# Patient Record
Sex: MALE | Race: ASIAN | NOT HISPANIC OR LATINO | URBAN - METROPOLITAN AREA
[De-identification: names, ages, dates, MRNs, and addresses within clinical notes are randomized per-mention and may not be internally consistent; named-entity substitution may affect disease eponyms.]

---

## 2018-06-28 ENCOUNTER — INPATIENT (INPATIENT)
Facility: HOSPITAL | Age: 58
LOS: 5 days | Discharge: HOME | DRG: 866 | End: 2018-07-04
Attending: INTERNAL MEDICINE | Admitting: INTERNAL MEDICINE
Payer: MEDICAID

## 2018-06-28 VITALS
DIASTOLIC BLOOD PRESSURE: 80 MMHG | RESPIRATION RATE: 17 BRPM | OXYGEN SATURATION: 99 % | WEIGHT: 164.91 LBS | HEART RATE: 64 BPM | SYSTOLIC BLOOD PRESSURE: 129 MMHG | TEMPERATURE: 98 F

## 2018-06-28 DIAGNOSIS — B02.7 DISSEMINATED ZOSTER: ICD-10-CM

## 2018-06-28 LAB
ALBUMIN SERPL ELPH-MCNC: 4.2 G/DL — SIGNIFICANT CHANGE UP (ref 3.3–5)
ALP SERPL-CCNC: 51 U/L — SIGNIFICANT CHANGE UP (ref 40–120)
ALT FLD-CCNC: 29 U/L — SIGNIFICANT CHANGE UP (ref 10–45)
ANION GAP SERPL CALC-SCNC: 13 MMOL/L — SIGNIFICANT CHANGE UP (ref 5–17)
AST SERPL-CCNC: 21 U/L — SIGNIFICANT CHANGE UP (ref 10–40)
BASOPHILS # BLD AUTO: 0 K/UL — SIGNIFICANT CHANGE UP (ref 0–0.2)
BASOPHILS NFR BLD AUTO: 0.1 % — SIGNIFICANT CHANGE UP (ref 0–2)
BILIRUB SERPL-MCNC: 0.6 MG/DL — SIGNIFICANT CHANGE UP (ref 0.2–1.2)
BUN SERPL-MCNC: 18 MG/DL — SIGNIFICANT CHANGE UP (ref 7–23)
CALCIUM SERPL-MCNC: 8.7 MG/DL — SIGNIFICANT CHANGE UP (ref 8.4–10.5)
CHLORIDE SERPL-SCNC: 93 MMOL/L — LOW (ref 96–108)
CO2 SERPL-SCNC: 25 MMOL/L — SIGNIFICANT CHANGE UP (ref 22–31)
CREAT SERPL-MCNC: 0.92 MG/DL — SIGNIFICANT CHANGE UP (ref 0.5–1.3)
EOSINOPHIL # BLD AUTO: 0 K/UL — SIGNIFICANT CHANGE UP (ref 0–0.5)
EOSINOPHIL NFR BLD AUTO: 0.3 % — SIGNIFICANT CHANGE UP (ref 0–6)
GLUCOSE SERPL-MCNC: 147 MG/DL — HIGH (ref 70–99)
HCT VFR BLD CALC: 39.2 % — SIGNIFICANT CHANGE UP (ref 39–50)
HGB BLD-MCNC: 13.7 G/DL — SIGNIFICANT CHANGE UP (ref 13–17)
LYMPHOCYTES # BLD AUTO: 0.8 K/UL — LOW (ref 1–3.3)
LYMPHOCYTES # BLD AUTO: 6.6 % — LOW (ref 13–44)
MCHC RBC-ENTMCNC: 32.4 PG — SIGNIFICANT CHANGE UP (ref 27–34)
MCHC RBC-ENTMCNC: 34.9 GM/DL — SIGNIFICANT CHANGE UP (ref 32–36)
MCV RBC AUTO: 92.9 FL — SIGNIFICANT CHANGE UP (ref 80–100)
MONOCYTES # BLD AUTO: 0.8 K/UL — SIGNIFICANT CHANGE UP (ref 0–0.9)
MONOCYTES NFR BLD AUTO: 6.7 % — SIGNIFICANT CHANGE UP (ref 2–14)
NEUTROPHILS # BLD AUTO: 10.8 K/UL — HIGH (ref 1.8–7.4)
NEUTROPHILS NFR BLD AUTO: 86.3 % — HIGH (ref 43–77)
PLATELET # BLD AUTO: 213 K/UL — SIGNIFICANT CHANGE UP (ref 150–400)
POTASSIUM SERPL-MCNC: 3.6 MMOL/L — SIGNIFICANT CHANGE UP (ref 3.5–5.3)
POTASSIUM SERPL-SCNC: 3.6 MMOL/L — SIGNIFICANT CHANGE UP (ref 3.5–5.3)
PROT SERPL-MCNC: 7.3 G/DL — SIGNIFICANT CHANGE UP (ref 6–8.3)
RBC # BLD: 4.22 M/UL — SIGNIFICANT CHANGE UP (ref 4.2–5.8)
RBC # FLD: 12.1 % — SIGNIFICANT CHANGE UP (ref 10.3–14.5)
SODIUM SERPL-SCNC: 131 MMOL/L — LOW (ref 135–145)
WBC # BLD: 12.5 K/UL — HIGH (ref 3.8–10.5)
WBC # FLD AUTO: 12.5 K/UL — HIGH (ref 3.8–10.5)

## 2018-06-28 PROCEDURE — 70450 CT HEAD/BRAIN W/O DYE: CPT | Mod: 26

## 2018-06-28 PROCEDURE — 99285 EMERGENCY DEPT VISIT HI MDM: CPT

## 2018-06-28 RX ORDER — ACETAMINOPHEN 500 MG
975 TABLET ORAL ONCE
Qty: 0 | Refills: 0 | Status: COMPLETED | OUTPATIENT
Start: 2018-06-28 | End: 2018-06-28

## 2018-06-28 RX ORDER — ACETAMINOPHEN 500 MG
650 TABLET ORAL EVERY 6 HOURS
Qty: 0 | Refills: 0 | Status: DISCONTINUED | OUTPATIENT
Start: 2018-06-28 | End: 2018-07-04

## 2018-06-28 RX ORDER — KETOROLAC TROMETHAMINE 30 MG/ML
15 SYRINGE (ML) INJECTION ONCE
Qty: 0 | Refills: 0 | Status: DISCONTINUED | OUTPATIENT
Start: 2018-06-28 | End: 2018-06-28

## 2018-06-28 RX ORDER — SODIUM CHLORIDE 9 MG/ML
1000 INJECTION INTRAMUSCULAR; INTRAVENOUS; SUBCUTANEOUS
Qty: 0 | Refills: 0 | Status: DISCONTINUED | OUTPATIENT
Start: 2018-06-28 | End: 2018-07-04

## 2018-06-28 RX ORDER — GABAPENTIN 400 MG/1
100 CAPSULE ORAL THREE TIMES A DAY
Qty: 0 | Refills: 0 | Status: DISCONTINUED | OUTPATIENT
Start: 2018-06-28 | End: 2018-07-01

## 2018-06-28 RX ORDER — SODIUM CHLORIDE 9 MG/ML
2000 INJECTION INTRAMUSCULAR; INTRAVENOUS; SUBCUTANEOUS ONCE
Qty: 0 | Refills: 0 | Status: COMPLETED | OUTPATIENT
Start: 2018-06-28 | End: 2018-06-28

## 2018-06-28 RX ORDER — HEPARIN SODIUM 5000 [USP'U]/ML
5000 INJECTION INTRAVENOUS; SUBCUTANEOUS EVERY 12 HOURS
Qty: 0 | Refills: 0 | Status: DISCONTINUED | OUTPATIENT
Start: 2018-06-28 | End: 2018-07-04

## 2018-06-28 RX ORDER — ACYCLOVIR SODIUM 500 MG
800 VIAL (EA) INTRAVENOUS
Qty: 0 | Refills: 0 | Status: DISCONTINUED | OUTPATIENT
Start: 2018-06-29 | End: 2018-06-29

## 2018-06-28 RX ORDER — ACYCLOVIR SODIUM 500 MG
800 VIAL (EA) INTRAVENOUS
Qty: 0 | Refills: 0 | Status: DISCONTINUED | OUTPATIENT
Start: 2018-06-28 | End: 2018-06-28

## 2018-06-28 RX ORDER — ACYCLOVIR SODIUM 500 MG
370 VIAL (EA) INTRAVENOUS EVERY 8 HOURS
Qty: 0 | Refills: 0 | Status: DISCONTINUED | OUTPATIENT
Start: 2018-06-28 | End: 2018-06-28

## 2018-06-28 RX ORDER — ACYCLOVIR SODIUM 500 MG
750 VIAL (EA) INTRAVENOUS ONCE
Qty: 0 | Refills: 0 | Status: COMPLETED | OUTPATIENT
Start: 2018-06-28 | End: 2018-06-28

## 2018-06-28 RX ORDER — ONDANSETRON 8 MG/1
4 TABLET, FILM COATED ORAL ONCE
Qty: 0 | Refills: 0 | Status: COMPLETED | OUTPATIENT
Start: 2018-06-28 | End: 2018-06-28

## 2018-06-28 RX ADMIN — Medication 975 MILLIGRAM(S): at 14:52

## 2018-06-28 RX ADMIN — Medication 15 MILLIGRAM(S): at 23:54

## 2018-06-28 RX ADMIN — Medication 650 MILLIGRAM(S): at 21:40

## 2018-06-28 RX ADMIN — ONDANSETRON 4 MILLIGRAM(S): 8 TABLET, FILM COATED ORAL at 12:55

## 2018-06-28 RX ADMIN — Medication 15 MILLIGRAM(S): at 14:52

## 2018-06-28 RX ADMIN — GABAPENTIN 100 MILLIGRAM(S): 400 CAPSULE ORAL at 21:40

## 2018-06-28 RX ADMIN — Medication 165 MILLIGRAM(S): at 21:41

## 2018-06-28 RX ADMIN — Medication 165 MILLIGRAM(S): at 13:32

## 2018-06-28 RX ADMIN — Medication 15 MILLIGRAM(S): at 18:45

## 2018-06-28 RX ADMIN — SODIUM CHLORIDE 100 MILLILITER(S): 9 INJECTION INTRAMUSCULAR; INTRAVENOUS; SUBCUTANEOUS at 21:40

## 2018-06-28 RX ADMIN — Medication 650 MILLIGRAM(S): at 22:10

## 2018-06-28 RX ADMIN — Medication 975 MILLIGRAM(S): at 18:45

## 2018-06-28 RX ADMIN — SODIUM CHLORIDE 1000 MILLILITER(S): 9 INJECTION INTRAMUSCULAR; INTRAVENOUS; SUBCUTANEOUS at 12:56

## 2018-06-28 NOTE — ED PROVIDER NOTE - MEDICAL DECISION MAKING DETAILS
57 yo M w/ shingles-like rash, generalized malaise, fever/chills, headache concerning for disseminated zoster; will check labs, CTH, treat empirically, likely admit for management

## 2018-06-28 NOTE — ED PROVIDER NOTE - ATTENDING CONTRIBUTION TO CARE
Patient with rash to left leg for 3-4 days, mild pain, spreading down the back of the leg and now there is a new lesion on the medial left foot just prior to the arch. mild viremia symptoms of headache and nausea, no f/c/d/c/sob/cp. patient without neuro complaint of dizziness, ms changes as per family.     GEN: mild distress secondary to headache and nausea, awake, eyes open spontaneously  HEENT: NCAT, dry MM, Trachea midline, normal conjunctiva, perrl  CHEST/LUNGS: Non-tachypneic, CTAB, bilateral breath sounds  CARDIAC: Non-tachycardic, normal perfusion  ABDOMEN: Soft, NTND, No rebound/guarding  MSK: No edema, no gross deformity of extremities  SKIN: no petechiae, there is a vesicular rash to S1-2, L4/5 distribution  NEURO: CN grossly intact, normal coordination, normal finger to nose, normal heel to shin, no focal motor or sensory deficits  PSYCH: Alert, appropriate, cooperative, with capacity and insight   concern for HSV in multiple dermatomes consistent with disseminated zoster, will get iv, labs, iv acyclovir at 10mg/kg, analgesia and antiemetic prn  Will follow up on labs, analgesia, reassess and likely disposition to the inpatient team as clinically indicated.

## 2018-06-28 NOTE — ED PROVIDER NOTE - PROGRESS NOTE DETAILS
Chapincito Powers PGY1: [facility] nl, paged Dr. Baig Chapincito Powers PGY1: d/w Dr. Baig, agreed w/ plan and to admission

## 2018-06-28 NOTE — ED PROVIDER NOTE - PHYSICAL EXAMINATION
*GEN:   comfortable, in no acute distress, AOx3    ///    *EYES:   pupils equally round and reactive to light, extra-occular movements intact    ///    *HEENT:   airway patent, moist mucosal membranes    ///    *CV:   regular rate and rhythm    ///    *RESP:   clear to auscultation bilaterally, non-labored    ///    *ABD:   soft, non-tender    ///    *:   no cva/flank tenderness    ///    *MSK:   no MSK tenderness or limited ROM    ///    *SKIN:   erythematous vesicular non-tender rash across medial L buttock 2x8cm area, as well as over medial L plantar 2x2cm    ///    *NEURO:   AOx3, cranial nerves intact throughout, strength 5/5, no focal loss of sensation, no pronator drift, finger/nose normal, ambulating w/ normal gait

## 2018-06-28 NOTE — ED PROVIDER NOTE - OBJECTIVE STATEMENT
59 yo M w/out pmh p/w headaches, N/V, and rash x 4 days. Reports HA was gradual onset and has been over bilateral scalp, mildly improved today s/p tylenol. Has been nauseous and vomiting multiple times, last today; unable to tolerate po at home. Noticed L buttock and L foot rash 4 days ago, pain started after. Denies sob, cough, loss of sensation, other recent infection, travel. Never had chicken pox before, unsure if vaccinated for it.

## 2018-06-28 NOTE — ED ADULT NURSE NOTE - OBJECTIVE STATEMENT
59 yo Pt ambulated to ED with wife. Complaint of headache,, N/V, left leg/foot/bottom rash for 4 days. Pt states rash started 4 days ago and became painful the following day. Pt had one episode of vomiting 1x today. Rash localized to left lateral thigh w/ one pustule on inner left foot. Pustules are red and blanchable, non-draining, no itching, no pain at this time. Rash does not cross the midline. Pt co of n/v, denies abd. pain, diarrhea, chest pain, fever, chills, SOB, and no other rashes present. On exam, lung sounds are clear, abd. soft, nontender, nondistended, no swelling, no recent travel. Neurologically intact. No history of shingles in the past. Pt fully undressed, call bell in hand, side rails up, pt in no acute distress. 57 yo Pt ambulated to ED with wife. Complaint of headache,, N/V, left leg/foot/bottom rash for 4 days. Pt states rash started 4 days ago and became painful the following day. Pt had one episode of vomiting 1x today. Rash localized to left lateral thigh w/ one pustule on inner left foot. Pustules are red and blanchable, non-draining, no itching, no pain at this time. placed on isolation/educated. Rash does not cross the midline. Pt co of n/v, denies abd. pain, diarrhea, chest pain, fever, chills, SOB, and no other rashes present. On exam, lung sounds are clear, abd. soft, nontender, nondistended, no swelling, no recent travel. Neurologically intact. No history of shingles in the past. Pt fully undressed, call bell in hand, side rails up, pt in no acute distress. 57 yo Pt ambulated to ED with wife. Complaint of headache,, N/V, left leg/foot/bottom rash for 4 days. Pt states rash started 4 days ago and became painful the following day. Pt had one episode of vomiting 1x today. Rash localized to left lateral thigh w/ one pustule on inner left foot. Pustules are red and blanchable, non-draining, no itching, no pain at this time. placed on isolation. Rash does not cross the midline. Pt co of n/v, denies abd. pain, diarrhea, chest pain, fever, chills, SOB, and no other rashes present. On exam, lung sounds are clear, abd. soft, nontender, nondistended, no swelling, no recent travel. Neurologically intact. No history of shingles in the past. Pt fully undressed, call bell in hand, side rails up, pt in no acute distress. 57 yo Pt ambulated to ED with wife. Complaint of headache,, N/V, left leg/foot/bottom rash for 4 days. Pt states rash started 4 days ago and became painful the following day. Reports HA was gradual onset and has been over bilateral scalp, mildly improved today s/p tylenol.  Pt had one episode of vomiting 1x today non bloody. Rash localized to left lateral thigh w/ one pustule on inner left foot. Pustules are red and blanchable, non-draining, no itching, no pain at this time. Rash does not cross the midline, no pain on rash when applying pressure. Pt co of n/v, denies abd. pain, diarrhea, chest pain, fever, chills, SOB, and no other rashes present. On exam, lung sounds are clear, abd. soft, nontender, nondistended, no swelling, no recent travel. Neurologically intact sensation and circulation intact. No history of shingles in the past. Pt fully undressed, placed on isolation (with sign / precautions) call bell in hand, side rails up, pt in no acute distress. never had chicken pox before.

## 2018-06-28 NOTE — H&P ADULT - NSHPLABSRESULTS_GEN_ALL_CORE
13.7   12.5  )-----------( 213      ( 28 Jun 2018 11:14 )             39.2       06-28    131<L>  |  93<L>  |  18  ----------------------------<  147<H>  3.6   |  25  |  0.92    Ca    8.7      28 Jun 2018 11:14    TPro  7.3  /  Alb  4.2  /  TBili  0.6  /  DBili  x   /  AST  21  /  ALT  29  /  AlkPhos  51  06-28                      Lactate Trend            CAPILLARY BLOOD GLUCOSE

## 2018-06-28 NOTE — ED ADULT NURSE REASSESSMENT NOTE - NS ED NURSE REASSESS COMMENT FT1
Pharmacy called for medication that is not available on the unit. Spoke with pharmacist (aaron) states the medication will be sent to the area as soon as it is available. pending medication at this time.

## 2018-06-28 NOTE — H&P ADULT - HISTORY OF PRESENT ILLNESS
57 yo Male no  pmh p/w headaches, N/V, and rash x 4 days.   Reports HA was gradual onset and has been over bilateral scalp, mildly improved today s/p tylenol.   Has been nauseous and vomiting multiple times, last today; unable to tolerate po at home.   Noticed L buttock and L foot rash 4 days ago, pain started after. Denies sob, cough, loss of sensation, other recent infection, travel. Never had chicken pox before, unsure if vaccinated

## 2018-06-28 NOTE — H&P ADULT - ASSESSMENT
pt w/ zoster   r/o disseminated disease  id eval  cont acyclovir   neurontin for nerve pain  dvt prophhtn  pt not on meds  monitor

## 2018-06-28 NOTE — ED ADULT NURSE REASSESSMENT NOTE - NS ED NURSE REASSESS COMMENT FT1
patient resting comfortably in bed with side rails up and call bell in hand. pt c.o 6/10 headache, lights dimmed for comfort and po medication offered. spoke with md culver for intervention, pending orders. vital signs stable, pending ct scan, called ct patient is next in line.

## 2018-06-29 LAB
ANION GAP SERPL CALC-SCNC: 10 MMOL/L — SIGNIFICANT CHANGE UP (ref 5–17)
BUN SERPL-MCNC: 21 MG/DL — SIGNIFICANT CHANGE UP (ref 7–23)
CALCIUM SERPL-MCNC: 7.9 MG/DL — LOW (ref 8.4–10.5)
CHLORIDE SERPL-SCNC: 96 MMOL/L — SIGNIFICANT CHANGE UP (ref 96–108)
CO2 SERPL-SCNC: 26 MMOL/L — SIGNIFICANT CHANGE UP (ref 22–31)
CREAT SERPL-MCNC: 1.04 MG/DL — SIGNIFICANT CHANGE UP (ref 0.5–1.3)
GLUCOSE SERPL-MCNC: 124 MG/DL — HIGH (ref 70–99)
HSV+VZV DNA SPEC QL NAA+PROBE: ABNORMAL
POTASSIUM SERPL-MCNC: 3.8 MMOL/L — SIGNIFICANT CHANGE UP (ref 3.5–5.3)
POTASSIUM SERPL-SCNC: 3.8 MMOL/L — SIGNIFICANT CHANGE UP (ref 3.5–5.3)
SODIUM SERPL-SCNC: 132 MMOL/L — LOW (ref 135–145)
SPECIMEN SOURCE: SIGNIFICANT CHANGE UP

## 2018-06-29 PROCEDURE — 99222 1ST HOSP IP/OBS MODERATE 55: CPT | Mod: GC

## 2018-06-29 RX ORDER — ACYCLOVIR SODIUM 500 MG
750 VIAL (EA) INTRAVENOUS EVERY 8 HOURS
Qty: 0 | Refills: 0 | Status: DISCONTINUED | OUTPATIENT
Start: 2018-06-29 | End: 2018-07-04

## 2018-06-29 RX ORDER — ACETAMINOPHEN 500 MG
650 TABLET ORAL EVERY 6 HOURS
Qty: 0 | Refills: 0 | Status: DISCONTINUED | OUTPATIENT
Start: 2018-06-29 | End: 2018-07-04

## 2018-06-29 RX ADMIN — Medication 800 MILLIGRAM(S): at 09:42

## 2018-06-29 RX ADMIN — Medication 800 MILLIGRAM(S): at 14:28

## 2018-06-29 RX ADMIN — GABAPENTIN 100 MILLIGRAM(S): 400 CAPSULE ORAL at 14:28

## 2018-06-29 RX ADMIN — GABAPENTIN 100 MILLIGRAM(S): 400 CAPSULE ORAL at 21:31

## 2018-06-29 RX ADMIN — Medication 650 MILLIGRAM(S): at 17:33

## 2018-06-29 RX ADMIN — HEPARIN SODIUM 5000 UNIT(S): 5000 INJECTION INTRAVENOUS; SUBCUTANEOUS at 05:27

## 2018-06-29 RX ADMIN — Medication 165 MILLIGRAM(S): at 21:31

## 2018-06-29 RX ADMIN — Medication 650 MILLIGRAM(S): at 06:00

## 2018-06-29 RX ADMIN — Medication 650 MILLIGRAM(S): at 05:28

## 2018-06-29 RX ADMIN — Medication 15 MILLIGRAM(S): at 00:11

## 2018-06-29 RX ADMIN — GABAPENTIN 100 MILLIGRAM(S): 400 CAPSULE ORAL at 05:28

## 2018-06-29 RX ADMIN — SODIUM CHLORIDE 60 MILLILITER(S): 9 INJECTION INTRAMUSCULAR; INTRAVENOUS; SUBCUTANEOUS at 21:31

## 2018-06-29 RX ADMIN — HEPARIN SODIUM 5000 UNIT(S): 5000 INJECTION INTRAVENOUS; SUBCUTANEOUS at 17:36

## 2018-06-29 NOTE — PROGRESS NOTE ADULT - SUBJECTIVE AND OBJECTIVE BOX
CHIEF COMPLAINT:Patient is a 58y old  Male who presents with a chief complaint of   	  Interval history:      Allergies:  No Known Allergies      PAST MEDICAL & SURGICAL HISTORY:  HTN (hypertension)      FAMILY HISTORY:      REVIEW OF SYSTEMS:  CONSTITUTIONAL: No fever, weight loss, or fatigue  EYES: No eye pain, visual disturbances, or discharge  NECK: No pain or stiffness  RESPIRATORY: No cough or wheezing, no shortness of breath  CARDIOVASCULAR: No chest pain, palpitations, dizziness, or leg swelling  GASTROINTESTINAL: No abdominal or epigastric pain. No nausea, vomiting, diarrhea or constipation  GENITOURINARY: No dysuria, urinary frequency or urgency, no hematuria  NEUROLOGICAL: + headaches, no memory loss, loss of strength, numbness, or tremors  SKIN: No itching, burning, + rashes  MUSCULOSKELETAL: No joint pain or swelling; No muscle, back, or extremity pain    Medications:  MEDICATIONS  (STANDING):  acyclovir   Tablet 800 milliGRAM(s) Oral five times a day  gabapentin 100 milliGRAM(s) Oral three times a day  heparin  Injectable 5000 Unit(s) SubCutaneous every 12 hours  sodium chloride 0.9%. 1000 milliLiter(s) (100 mL/Hr) IV Continuous <Continuous>    MEDICATIONS  (PRN):  acetaminophen   Tablet. 650 milliGRAM(s) Oral every 6 hours PRN Mild Pain (1 - 3)    	    PHYSICAL EXAM:  T(C): 38.3 (06-29-18 @ 11:59), Max: 38.3 (06-29-18 @ 11:59)  HR: 81 (06-29-18 @ 11:59) (63 - 81)  BP: 145/76 (06-29-18 @ 11:59) (104/62 - 145/76)  RR: 18 (06-29-18 @ 11:59) (16 - 18)  SpO2: 96% (06-29-18 @ 11:59) (96% - 100%)  Wt(kg): --  I&O's Summary    28 Jun 2018 07:01  -  29 Jun 2018 07:00  --------------------------------------------------------  IN: 1660 mL / OUT: 0 mL / NET: 1660 mL        Appearance: Normal	  HEENT:   NCAT, PERRL, EOMI	  Lymphatic: No lymphadenopathy  Cardiovascular: Normal S1 S2, RRR  Respiratory: Lungs clear to auscultation BL  Psychiatry: A & O x 3, Mood & affect appropriate  Gastrointestinal:  Soft, Non-tender, + BS  Skin: L buttock and L foot rash  Neurologic: Non-focal  Extremities: Normal range of motion, No clubbing, cyanosis or edema    	  LABS:	 	    CARDIAC MARKERS:                                13.7   12.5  )-----------( 213      ( 28 Jun 2018 11:14 )             39.2     06-29    132<L>  |  96  |  21  ----------------------------<  124<H>  3.8   |  26  |  1.04    Ca    7.9<L>      29 Jun 2018 07:05    TPro  7.3  /  Alb  4.2  /  TBili  0.6  /  DBili  x   /  AST  21  /  ALT  29  /  AlkPhos  51  06-28    proBNP:   Lipid Profile:   HgA1c:   TSH:

## 2018-06-29 NOTE — CONSULT NOTE ADULT - ASSESSMENT
57 yo Male PMH HTN p/w headaches, N/V, and rash over L4-S1 dermatomes, now with fever to 101 F  No meningeal signs, or photophobia, fever and headaches are likely secondary to disseminated zoster    Recommend:  f/u swab  Blood cx x 2, HIV  Acyclovir 10mg/kg IV q8h with IVF to prevent renal failure 57 yo Male PMH HTN p/w headaches, N/V, and rash over L4-S1 dermatomes, now with fever to 101 F  No meningeal signs, or photophobia, fever and headaches - lesion are likely secondary to disseminated zoster    Recommend:  -F/U swab of lesion sent to lab  -Check blood cx x 2 sets  -Check HIV - patient agreeable  -Change to Acyclovir 10mg/kg IV q8h with IVF while on acyclovir  -Continue airborne and contact isolation until all lesions crusted.    ID service available on the weekend. For questions, please call [phone].

## 2018-06-29 NOTE — CONSULT NOTE ADULT - SUBJECTIVE AND OBJECTIVE BOX
HPI:  57 yo Male no  pmh p/w headaches, N/V, and rash x 4 days.   Reports HA was gradual onset and has been over bilateral scalp, mildly improved today s/p tylenol.   Has been nauseous and vomiting multiple times, last today; unable to tolerate po at home.   Noticed L buttock and L foot rash 4 days ago, pain started after. Denies sob, cough, loss of sensation, other recent infection, travel. Never had chicken pox before, unsure if vaccinated (28 Jun 2018 18:40)      PAST MEDICAL & SURGICAL HISTORY:  HTN (hypertension)      Allergies  No Known Allergies        ANTIMICROBIALS:  acyclovir   Tablet 800 five times a day      OTHER MEDS: MEDICATIONS  (STANDING):  acetaminophen   Tablet 650 every 6 hours PRN  acetaminophen   Tablet. 650 every 6 hours PRN  gabapentin 100 three times a day  heparin  Injectable 5000 every 12 hours      SOCIAL HISTORY:  [ ] etoh [ ] tobacco [ ] former smoker [ ] IVDU    FAMILY HISTORY:      REVIEW OF SYSTEMS  [  ] ROS unobtainable because:    [x  ] All other systems negative except as noted below:	    Constitutional:  [ ] fever [ ] chills  [ ] weight loss  [ ] weakness  Skin:  [ ] rash [ ] phlebitis	  Eyes: [ ] icterus [ ] pain  [ ] discharge	  ENMT: [ ] sore throat  [ ] thrush [ ] ulcers [ ] exudates  Respiratory: [ ] dyspnea [ ] hemoptysis [ ] cough [ ] sputum	  Cardiovascular:  [ ] chest pain [ ] palpitations [ ] edema	  Gastrointestinal:  [ ] nausea [ ] vomiting [ ] diarrhea [ ] constipation [ ] pain	  Genitourinary:  [ ] dysuria [ ] frequency [ ] hematuria [ ] discharge [ ] flank pain  [ ] incontinence  Musculoskeletal:  [ ] myalgias [ ] arthralgias [ ] arthritis  [ ] back pain  Neurological:  [ ] headache [ ] seizures  [ ] confusion/altered mental status  Psychiatric:  [ ] anxiety [ ] depression	  Hematology/Lymphatics:  [ ] lymphadenopathy  Endocrine:  [ ] adrenal [ ] thyroid  Allergic/Immunologic:	 [ ] transplant [ ] seasonal    Vital Signs Last 24 Hrs  T(F): 101.3 (06-29-18 @ 16:10), Max: 101.3 (06-29-18 @ 16:10)    Vital Signs Last 24 Hrs  HR: 71 (06-29-18 @ 16:10) (63 - 81)  BP: 127/73 (06-29-18 @ 16:10) (104/62 - 145/76)  RR: 18 (06-29-18 @ 16:10)  SpO2: 96% (06-29-18 @ 16:10) (96% - 98%)  Wt(kg): --    PHYSICAL EXAM:  General: non-toxic  HEAD/EYES: anicteric, PERRL  ENT:  supple  Cardiovascular:   S1, S2  Respiratory:  clear bilaterally  GI:  soft, non-tender, normal bowel sounds  :  no CVA tenderness   Musculoskeletal:  no synovitis  Neurologic:  grossly non-focal  Skin:  no rash  Lymph: no lymphadenopathy  Psychiatric:  appropriate affect  Vascular:  no phlebitis                                13.7   12.5  )-----------( 213      ( 28 Jun 2018 11:14 )             39.2       06-29    132<L>  |  96  |  21  ----------------------------<  124<H>  3.8   |  26  |  1.04    Ca    7.9<L>      29 Jun 2018 07:05    TPro  7.3  /  Alb  4.2  /  TBili  0.6  /  DBili  x   /  AST  21  /  ALT  29  /  AlkPhos  51  06-28          MICROBIOLOGY:          v            RADIOLOGY: HPI:  57 yo Male no  PMH p/w headaches, N/V, and rash x 4 days.   Reports HA was gradual onset and has been over bilateral scalp, mildly improved today s/p tylenol.   Has been nauseous and vomiting multiple times, last today; unable to tolerate po at home.   Noticed L buttock and L foot rash 4 days ago, pain started after. Denies sob, cough, loss of sensation, other recent infection, travel. Never had chicken pox before, unsure if vaccinated (28 Jun 2018 18:40)    ID note- patient seen twice, first daughter on phone provided translation, and on second visit,  ID used [id number]. About 8 days ago he noticed a rash on the back of left leg, never had this before. Non pruritic and not painful, later also noticed on dorsum of left foot. about 4 days later started to feel unwell with headaches started taking tylenol every 4-6 hours which did not help. The next day started vomiting for the next 2 days, sent in by PCP to ED. No photophobia, neck pain, cough, diarrhea, urinary symptoms  Thinks he may have had chicken pox in the past. Has never had shingles like this since then    PAST MEDICAL & SURGICAL HISTORY:  HTN (hypertension)      Allergies  No Known Allergies        ANTIMICROBIALS:  acyclovir   Tablet 800 five times a day      OTHER MEDS: MEDICATIONS  (STANDING):  acetaminophen   Tablet 650 every 6 hours PRN  acetaminophen   Tablet. 650 every 6 hours PRN  gabapentin 100 three times a day  heparin  Injectable 5000 every 12 hours      SOCIAL HISTORY:  works as , born in [place], moved here 30 years ago, no recent travel bug bites, sick contacts    FAMILY HISTORY:  none    REVIEW OF SYSTEMS  [  ] ROS unobtainable because:    [x ] All other systems negative except as noted below:	    Constitutional:  [ ] fever [ ] chills  [ ] weight loss  [ ] weakness  Skin:  [ ] rash [ ] phlebitis	  Eyes: [ ] icterus [ ] pain  [ ] discharge	  ENMT: [ ] sore throat  [ ] thrush [ ] ulcers [ ] exudates  Respiratory: [ ] dyspnea [ ] hemoptysis [ ] cough [ ] sputum	  Cardiovascular:  [ ] chest pain [ ] palpitations [ ] edema	  Gastrointestinal:  [ ] nausea [ ] vomiting [ ] diarrhea [ ] constipation [ ] pain	  Genitourinary:  [ ] dysuria [ ] frequency [ ] hematuria [ ] discharge [ ] flank pain  [ ] incontinence  Musculoskeletal:  [ ] myalgias [ ] arthralgias [ ] arthritis  [ ] back pain  Neurological:  [ ] headache [ ] seizures  [ ] confusion/altered mental status  Psychiatric:  [ ] anxiety [ ] depression	  Hematology/Lymphatics:  [ ] lymphadenopathy  Endocrine:  [ ] adrenal [ ] thyroid  Allergic/Immunologic:	 [ ] transplant [ ] seasonal    Vital Signs Last 24 Hrs  T(F): 101.3 (06-29-18 @ 16:10), Max: 101.3 (06-29-18 @ 16:10)    Vital Signs Last 24 Hrs  HR: 71 (06-29-18 @ 16:10) (63 - 81)  BP: 127/73 (06-29-18 @ 16:10) (104/62 - 145/76)  RR: 18 (06-29-18 @ 16:10)  SpO2: 96% (06-29-18 @ 16:10) (96% - 98%)  Wt(kg): --    PHYSICAL EXAM:  General: non-toxic  HEAD/EYES: anicteric, PERRL  ENT:  supple  Cardiovascular:   S1, S2  Respiratory:  clear bilaterally  GI:  soft, non-tender, normal bowel sounds  :  no CVA tenderness   Musculoskeletal:  no synovitis  Neurologic:  grossly non-focal  Skin:  L4, S1 vesicular rash  Lymph: no lymphadenopathy  Psychiatric:  appropriate affect  Vascular:  no phlebitis                                13.7   12.5  )-----------( 213      ( 28 Jun 2018 11:14 )             39.2       06-29    132<L>  |  96  |  21  ----------------------------<  124<H>  3.8   |  26  |  1.04    Ca    7.9<L>      29 Jun 2018 07:05    TPro  7.3  /  Alb  4.2  /  TBili  0.6  /  DBili  x   /  AST  21  /  ALT  29  /  AlkPhos  51  06-28          MICROBIOLOGY:          v            RADIOLOGY: HPI:  57 yo Male no  PMH p/w headaches, N/V, and rash x 4 days.   Reports HA was gradual onset and has been over bilateral scalp, mildly improved today s/p tylenol.   Has been nauseous and vomiting multiple times, last today; unable to tolerate po at home.   Noticed L buttock and L foot rash 4 days ago, pain started after. Denies sob, cough, loss of sensation, other recent infection, travel. Never had chicken pox before, unsure if vaccinated (28 Jun 2018 18:40)    ID note- patient seen twice, first daughter on phone provided translation, and on second visit,  ID used [id number]. About 8 days ago he noticed a rash on the back of left leg, never had this before. Non pruritic and not painful, later also noticed on dorsum of left foot. about 4 days later started to feel unwell with headaches started taking tylenol every 4-6 hours which did not help. The next day started vomiting for the next 2 days, sent in by PCP to ED. No photophobia, neck pain, cough, diarrhea, urinary symptoms  Thinks he may have had chicken pox in the past. Has never had shingles like this since then    PAST MEDICAL & SURGICAL HISTORY:  HTN (hypertension)      Allergies  No Known Allergies        ANTIMICROBIALS:  acyclovir   Tablet 800 five times a day      OTHER MEDS: MEDICATIONS  (STANDING):  acetaminophen   Tablet 650 every 6 hours PRN  acetaminophen   Tablet. 650 every 6 hours PRN  gabapentin 100 three times a day  heparin  Injectable 5000 every 12 hours      SOCIAL HISTORY:  works as , born in [place], moved here 30 years ago, no recent travel bug bites, sick contacts    FAMILY HISTORY:  none    REVIEW OF SYSTEMS  [  ] ROS unobtainable because:    [x ] All other systems negative except as noted below:	    Constitutional:  [ ] fever [ ] chills  [ ] weight loss  [ ] weakness  Skin:  [X] rash [ ] phlebitis	  Eyes: [ ] icterus [ ] pain  [ ] discharge	  ENMT: [ ] sore throat  [ ] thrush [ ] ulcers [ ] exudates  Respiratory: [ ] dyspnea [ ] hemoptysis [ ] cough [ ] sputum	  Cardiovascular:  [ ] chest pain [ ] palpitations [ ] edema	  Gastrointestinal:  [X] nausea [X] vomiting [ ] diarrhea [ ] constipation [ ] pain	  Genitourinary:  [ ] dysuria [ ] frequency [ ] hematuria [ ] discharge [ ] flank pain  [ ] incontinence  Musculoskeletal:  [ ] myalgias [ ] arthralgias [ ] arthritis  [ ] back pain  Neurological:  [X] headache [ ] seizures  [ ] confusion/altered mental status  Psychiatric:  [ ] anxiety [ ] depression	  Hematology/Lymphatics:  [ ] lymphadenopathy  Endocrine:  [ ] adrenal [ ] thyroid  Allergic/Immunologic:	 [ ] transplant [ ] seasonal    Vital Signs Last 24 Hrs  T(F): 101.3 (06-29-18 @ 16:10), Max: 101.3 (06-29-18 @ 16:10)    Vital Signs Last 24 Hrs  HR: 71 (06-29-18 @ 16:10) (63 - 81)  BP: 127/73 (06-29-18 @ 16:10) (104/62 - 145/76)  RR: 18 (06-29-18 @ 16:10)  SpO2: 96% (06-29-18 @ 16:10) (96% - 98%)  Wt(kg): --    PHYSICAL EXAM:  General: non-toxic  HEAD/EYES: anicteric, PERRL  ENT:  supple  Cardiovascular:   S1, S2  Respiratory:  clear bilaterally  GI:  soft, non-tender, normal bowel sounds  :  no CVA tenderness   Musculoskeletal:  no synovitis  Neurologic:  grossly non-focal  Skin:  L4, S1 vesicular rash  Lymph: no lymphadenopathy  Psychiatric:  appropriate affect  Vascular:  no phlebitis                                13.7   12.5  )-----------( 213      ( 28 Jun 2018 11:14 )             39.2       06-29    132<L>  |  96  |  21  ----------------------------<  124<H>  3.8   |  26  |  1.04    Ca    7.9<L>      29 Jun 2018 07:05    TPro  7.3  /  Alb  4.2  /  TBili  0.6  /  DBili  x   /  AST  21  /  ALT  29  /  AlkPhos  51  06-28      MICROBIOLOGY:    RADIOLOGY:    < from: CT Head No Cont (06.28.18 @ 15:44) >  IMPRESSION: Unremarkable noncontrast CT of the brain.    < end of copied text >

## 2018-06-29 NOTE — PROGRESS NOTE ADULT - ASSESSMENT
pt w/ zoster   r/o disseminated disease  id eval pending  cont acyclovir   neurontin for nerve pain  dvt proph  htn  pt not on meds  monitor  d/c planning if ok for PO Acyclovir from ID

## 2018-06-29 NOTE — PROVIDER CONTACT NOTE (MEDICATION) - ACTION/TREATMENT ORDERED:
Tylenol 650 mg PO one time dose ordered, continue to monitor. Tylenol 650 mg PO ordered PRN for pain, continue to monitor.

## 2018-06-30 LAB
ANION GAP SERPL CALC-SCNC: 12 MMOL/L — SIGNIFICANT CHANGE UP (ref 5–17)
BUN SERPL-MCNC: 12 MG/DL — SIGNIFICANT CHANGE UP (ref 7–23)
CALCIUM SERPL-MCNC: 7.6 MG/DL — LOW (ref 8.4–10.5)
CHLORIDE SERPL-SCNC: 96 MMOL/L — SIGNIFICANT CHANGE UP (ref 96–108)
CO2 SERPL-SCNC: 27 MMOL/L — SIGNIFICANT CHANGE UP (ref 22–31)
CREAT SERPL-MCNC: 1.08 MG/DL — SIGNIFICANT CHANGE UP (ref 0.5–1.3)
GLUCOSE SERPL-MCNC: 113 MG/DL — HIGH (ref 70–99)
HCT VFR BLD CALC: 38.3 % — LOW (ref 39–50)
HGB BLD-MCNC: 13 G/DL — SIGNIFICANT CHANGE UP (ref 13–17)
HIV 1+2 AB+HIV1 P24 AG SERPL QL IA: SIGNIFICANT CHANGE UP
MCHC RBC-ENTMCNC: 30.4 PG — SIGNIFICANT CHANGE UP (ref 27–34)
MCHC RBC-ENTMCNC: 33.9 GM/DL — SIGNIFICANT CHANGE UP (ref 32–36)
MCV RBC AUTO: 89.7 FL — SIGNIFICANT CHANGE UP (ref 80–100)
PLATELET # BLD AUTO: 217 K/UL — SIGNIFICANT CHANGE UP (ref 150–400)
POTASSIUM SERPL-MCNC: 3.8 MMOL/L — SIGNIFICANT CHANGE UP (ref 3.5–5.3)
POTASSIUM SERPL-SCNC: 3.8 MMOL/L — SIGNIFICANT CHANGE UP (ref 3.5–5.3)
RBC # BLD: 4.27 M/UL — SIGNIFICANT CHANGE UP (ref 4.2–5.8)
RBC # FLD: 13 % — SIGNIFICANT CHANGE UP (ref 10.3–14.5)
SODIUM SERPL-SCNC: 135 MMOL/L — SIGNIFICANT CHANGE UP (ref 135–145)
WBC # BLD: 8.01 K/UL — SIGNIFICANT CHANGE UP (ref 3.8–10.5)
WBC # FLD AUTO: 8.01 K/UL — SIGNIFICANT CHANGE UP (ref 3.8–10.5)

## 2018-06-30 RX ORDER — LOSARTAN POTASSIUM 100 MG/1
1 TABLET, FILM COATED ORAL
Qty: 0 | Refills: 0 | COMMUNITY

## 2018-06-30 RX ORDER — LOSARTAN POTASSIUM 100 MG/1
25 TABLET, FILM COATED ORAL DAILY
Qty: 0 | Refills: 0 | Status: DISCONTINUED | OUTPATIENT
Start: 2018-06-30 | End: 2018-07-04

## 2018-06-30 RX ORDER — CALCIUM GLUCONATE 100 MG/ML
1 VIAL (ML) INTRAVENOUS ONCE
Qty: 0 | Refills: 0 | Status: COMPLETED | OUTPATIENT
Start: 2018-06-30 | End: 2018-06-30

## 2018-06-30 RX ADMIN — Medication 1 TABLET(S): at 17:38

## 2018-06-30 RX ADMIN — HEPARIN SODIUM 5000 UNIT(S): 5000 INJECTION INTRAVENOUS; SUBCUTANEOUS at 17:40

## 2018-06-30 RX ADMIN — LOSARTAN POTASSIUM 25 MILLIGRAM(S): 100 TABLET, FILM COATED ORAL at 17:38

## 2018-06-30 RX ADMIN — Medication 200 GRAM(S): at 17:38

## 2018-06-30 RX ADMIN — Medication 165 MILLIGRAM(S): at 12:50

## 2018-06-30 RX ADMIN — Medication 165 MILLIGRAM(S): at 06:45

## 2018-06-30 RX ADMIN — GABAPENTIN 100 MILLIGRAM(S): 400 CAPSULE ORAL at 21:55

## 2018-06-30 RX ADMIN — Medication 165 MILLIGRAM(S): at 21:54

## 2018-06-30 RX ADMIN — HEPARIN SODIUM 5000 UNIT(S): 5000 INJECTION INTRAVENOUS; SUBCUTANEOUS at 06:45

## 2018-06-30 RX ADMIN — Medication 650 MILLIGRAM(S): at 16:21

## 2018-06-30 RX ADMIN — GABAPENTIN 100 MILLIGRAM(S): 400 CAPSULE ORAL at 12:50

## 2018-06-30 RX ADMIN — GABAPENTIN 100 MILLIGRAM(S): 400 CAPSULE ORAL at 06:45

## 2018-06-30 NOTE — PROVIDER CONTACT NOTE (MEDICATION) - SITUATION
Received abnormal value from [facility] at 05:58 via Brenda Overton - abnormal result: HSV 1, VZV detected.

## 2018-06-30 NOTE — PROGRESS NOTE ADULT - SUBJECTIVE AND OBJECTIVE BOX
PAST MEDICAL & SURGICAL HISTORY:  HTN (hypertension)          REVIEW OF SYSTEMS:  CONSTITUTIONAL: No fever, weight loss, or fatigue  EYES: No eye pain, visual disturbances, or discharge  NECK: No pain or stiffness  RESPIRATORY: No cough, wheezing, chills or hemoptysis; No Shortness of Breath  CARDIOVASCULAR: No chest pain, palpitations, passing out, dizziness, or leg swelling  GASTROINTESTINAL: No abdominal or epigastric pain. No nausea, vomiting, or hematemesis; No diarrhea or constipation. No melena or hematochezia.  GENITOURINARY: No dysuria, frequency, hematuria, or incontinence  NEUROLOGICAL: No headaches, memory loss, loss of strength, numbness, or tremors  SKIN: lesions on buttock/l  LYMPH Nodes: No enlarged glands  ENDOCRINE: No heat or cold intolerance; No hair loss  MUSCULOSKELETAL: No joint pain or swelling; No muscle, back, or extremity pain    Medications:  MEDICATIONS  (STANDING):  acyclovir IVPB 750 milliGRAM(s) IV Intermittent every 8 hours  gabapentin 100 milliGRAM(s) Oral three times a day  heparin  Injectable 5000 Unit(s) SubCutaneous every 12 hours  sodium chloride 0.9%. 1000 milliLiter(s) (60 mL/Hr) IV Continuous <Continuous>    MEDICATIONS  (PRN):  acetaminophen   Tablet 650 milliGRAM(s) Oral every 6 hours PRN For Temp greater than 38.5 C (101.3 F)  acetaminophen   Tablet. 650 milliGRAM(s) Oral every 6 hours PRN Mild Pain (1 - 3)    	    PHYSICAL EXAM:  T(C): 36.8 (06-30-18 @ 06:43), Max: 38.5 (06-29-18 @ 16:10)  HR: 80 (06-30-18 @ 06:43) (71 - 81)  BP: 131/80 (06-30-18 @ 06:43) (126/71 - 145/76)  RR: 18 (06-30-18 @ 06:43) (18 - 18)  SpO2: 94% (06-30-18 @ 06:43) (94% - 96%)  Wt(kg): --  I&O's Summary    29 Jun 2018 07:01  -  30 Jun 2018 07:00  --------------------------------------------------------  IN: 1620 mL / OUT: 0 mL / NET: 1620 mL    30 Jun 2018 07:01  -  30 Jun 2018 10:21  --------------------------------------------------------  IN: 240 mL / OUT: 0 mL / NET: 240 mL        Appearance: Normal	  HEENT:   Normal oral mucosa, PERRL, EOMI	  Lymphatic: No lymphadenopathy  Cardiovascular: Normal S1 S2, No JVD, No murmurs, No edema  Respiratory: Lungs clear to auscultation	  Psychiatry: A & O x 3, Mood & affect appropriate  Gastrointestinal:  Soft, Non-tender, + BS	  Skin left buttock lesions 	  Neurologic: Non-focal  Extremities: Normal range of motion, No clubbing, cyanosis or edema  Vascular: Peripheral pulses palpable 2+ bilaterally    TELEMETRY: 	    ECG:  	  RADIOLOGY:  OTHER: 	  	  LABS:	 	    CARDIAC MARKERS:                                13.0   8.01  )-----------( 217      ( 30 Jun 2018 08:20 )             38.3     06-30    135  |  96  |  12  ----------------------------<  113<H>  3.8   |  27  |  1.08    Ca    7.6<L>      30 Jun 2018 07:03    TPro  7.3  /  Alb  4.2  /  TBili  0.6  /  DBili  x   /  AST  21  /  ALT  29  /  AlkPhos  51  06-28    proBNP:   Lipid Profile:   HgA1c:   TSH:

## 2018-06-30 NOTE — PROVIDER CONTACT NOTE (OTHER) - BACKGROUND
Patient admitted with dessimenated herpes zoster & on Acyclovir IVSS.  Febrile yesterday & blood cx sent on 6/29

## 2018-06-30 NOTE — PROGRESS NOTE ADULT - ASSESSMENT
pt w/ zoster   r/o disseminated disease  id eval noted  cont acyclovir   neurontin for nerve pain  dvt proph  htn  pt not on meds  monitor  d/c planning if ok for PO Acyclovir from ID

## 2018-07-01 ENCOUNTER — TRANSCRIPTION ENCOUNTER (OUTPATIENT)
Age: 58
End: 2018-07-01

## 2018-07-01 LAB
ANION GAP SERPL CALC-SCNC: 12 MMOL/L — SIGNIFICANT CHANGE UP (ref 5–17)
BUN SERPL-MCNC: 10 MG/DL — SIGNIFICANT CHANGE UP (ref 7–23)
CALCIUM SERPL-MCNC: 8 MG/DL — LOW (ref 8.4–10.5)
CHLORIDE SERPL-SCNC: 96 MMOL/L — SIGNIFICANT CHANGE UP (ref 96–108)
CO2 SERPL-SCNC: 31 MMOL/L — SIGNIFICANT CHANGE UP (ref 22–31)
CREAT SERPL-MCNC: 1.1 MG/DL — SIGNIFICANT CHANGE UP (ref 0.5–1.3)
GLUCOSE SERPL-MCNC: 116 MG/DL — HIGH (ref 70–99)
POTASSIUM SERPL-MCNC: 3.7 MMOL/L — SIGNIFICANT CHANGE UP (ref 3.5–5.3)
POTASSIUM SERPL-SCNC: 3.7 MMOL/L — SIGNIFICANT CHANGE UP (ref 3.5–5.3)
SODIUM SERPL-SCNC: 139 MMOL/L — SIGNIFICANT CHANGE UP (ref 135–145)

## 2018-07-01 RX ADMIN — Medication 165 MILLIGRAM(S): at 13:13

## 2018-07-01 RX ADMIN — GABAPENTIN 100 MILLIGRAM(S): 400 CAPSULE ORAL at 05:46

## 2018-07-01 RX ADMIN — HEPARIN SODIUM 5000 UNIT(S): 5000 INJECTION INTRAVENOUS; SUBCUTANEOUS at 17:20

## 2018-07-01 RX ADMIN — HEPARIN SODIUM 5000 UNIT(S): 5000 INJECTION INTRAVENOUS; SUBCUTANEOUS at 05:46

## 2018-07-01 RX ADMIN — Medication 165 MILLIGRAM(S): at 21:40

## 2018-07-01 RX ADMIN — Medication 1 TABLET(S): at 13:13

## 2018-07-01 RX ADMIN — LOSARTAN POTASSIUM 25 MILLIGRAM(S): 100 TABLET, FILM COATED ORAL at 05:45

## 2018-07-01 RX ADMIN — Medication 165 MILLIGRAM(S): at 05:45

## 2018-07-01 NOTE — DISCHARGE NOTE ADULT - MEDICATION SUMMARY - MEDICATIONS TO TAKE
I will START or STAY ON the medications listed below when I get home from the hospital:    losartan 25 mg oral tablet  -- 1 tab(s) by mouth once a day  -- Indication: For blood pressure    Multiple Vitamins oral tablet  -- 1 tab(s) by mouth once a day  -- Indication: For vitamin supplement

## 2018-07-01 NOTE — DISCHARGE NOTE ADULT - INSTRUCTIONS
call for  follow  up  appointment   with  primary care  md     diet  medications  activity  as per md   any  fevers  severe  pain increaed rash  any  shortness of breath any  problems   call md  call 911   [facility]  EMERGENCY ROOM Regular

## 2018-07-01 NOTE — PROGRESS NOTE ADULT - SUBJECTIVE AND OBJECTIVE BOX
PAST MEDICAL & SURGICAL HISTORY:  HTN (hypertension)          REVIEW OF SYSTEMS:  CONSTITUTIONAL: No fever, weight loss, or fatigue  EYES: No eye pain, visual disturbances, or discharge  NECK: No pain or stiffness  RESPIRATORY: No cough, wheezing, chills or hemoptysis; No Shortness of Breath  CARDIOVASCULAR: No chest pain, palpitations, passing out, dizziness, or leg swelling  GASTROINTESTINAL: No abdominal or epigastric pain. No nausea, vomiting, or hematemesis; No diarrhea or constipation. No melena or hematochezia.  GENITOURINARY: No dysuria, frequency, hematuria, or incontinence  NEUROLOGICAL: No headaches, memory loss, loss of strength, numbness, or tremors  rash buttock  MUSCULOSKELETAL: No joint pain or swelling; No muscle, back, or extremity pain    Medications:  MEDICATIONS  (STANDING):  acyclovir IVPB 750 milliGRAM(s) IV Intermittent every 8 hours  heparin  Injectable 5000 Unit(s) SubCutaneous every 12 hours  losartan 25 milliGRAM(s) Oral daily  multivitamin 1 Tablet(s) Oral daily  sodium chloride 0.9%. 1000 milliLiter(s) (60 mL/Hr) IV Continuous <Continuous>    MEDICATIONS  (PRN):  acetaminophen   Tablet 650 milliGRAM(s) Oral every 6 hours PRN For Temp greater than 38.5 C (101.3 F)  acetaminophen   Tablet. 650 milliGRAM(s) Oral every 6 hours PRN Mild Pain (1 - 3)    	    PHYSICAL EXAM:  T(C): 36.9 (07-01-18 @ 09:03), Max: 38.8 (06-30-18 @ 16:05)  HR: 79 (07-01-18 @ 09:03) (61 - 79)  BP: 118/76 (07-01-18 @ 09:03) (118/76 - 133/75)  RR: 16 (07-01-18 @ 09:03) (16 - 18)  SpO2: 96% (07-01-18 @ 05:45) (95% - 96%)  Wt(kg): --  I&O's Summary    30 Jun 2018 07:01  -  01 Jul 2018 07:00  --------------------------------------------------------  IN: 2690 mL / OUT: 0 mL / NET: 2690 mL    01 Jul 2018 07:01  -  01 Jul 2018 11:35  --------------------------------------------------------  IN: 420 mL / OUT: 0 mL / NET: 420 mL        Appearance: Normal	  HEENT:   Normal oral mucosa, PERRL, EOMI	  Lymphatic: No lymphadenopathy  Cardiovascular: Normal S1 S2, No JVD, No murmurs, No edema  Respiratory: Lungs clear to auscultation	  Psychiatry: A & O x 3, Mood & affect appropriate  Gastrointestinal:  Soft, Non-tender, + BS	  Skin:rash/lesions vesicular on l buttock /foot  Neurologic: Non-focal  Extremities: Normal range of motion, No clubbing, cyanosis or edema  Vascular: Peripheral pulses palpable 2+ bilaterally    TELEMETRY: 	    ECG:  	  RADIOLOGY:  OTHER: 	  	  LABS:	 	    CARDIAC MARKERS:                                13.0   8.01  )-----------( 217      ( 30 Jun 2018 08:20 )             38.3     07-01    139  |  96  |  10  ----------------------------<  116<H>  3.7   |  31  |  1.10    Ca    8.0<L>      01 Jul 2018 07:02      proBNP:   Lipid Profile:   HgA1c:   TSH:

## 2018-07-01 NOTE — DISCHARGE NOTE ADULT - CARE PLAN
Principal Discharge DX:	Disseminated herpes zoster  Goal:	Free from reoccurrence of infection  Assessment and plan of treatment:	Completed course of Acyclovir  Secondary Diagnosis:	Essential hypertension  Assessment and plan of treatment:	Low salt diet. Activity as tolerated. Take all medication as prescribed.  Follow up with your medical doctor for routine blood pressure monitoring at your next visit.  Notify your doctor if you have any of the following symptoms:   Dizziness, Lightheadedness, Blurry vision, Headache, Chest pain, Shortness of breath

## 2018-07-01 NOTE — DISCHARGE NOTE ADULT - PLAN OF CARE
Free from reoccurrence of infection Completed course of Acyclovir Low salt diet. Activity as tolerated. Take all medication as prescribed.  Follow up with your medical doctor for routine blood pressure monitoring at your next visit.  Notify your doctor if you have any of the following symptoms:   Dizziness, Lightheadedness, Blurry vision, Headache, Chest pain, Shortness of breath

## 2018-07-01 NOTE — DISCHARGE NOTE ADULT - PATIENT PORTAL LINK FT
You can access the [facility] Patient Portal, offered by [facility], by registering with the following website: [email or web address]

## 2018-07-01 NOTE — DISCHARGE NOTE ADULT - HOSPITAL COURSE
59 yo Male no  pmh p/w headaches, N/V, and rash x 4 days.   Reports HA was gradual onset and has been over bilateral scalp, mildly improved today s/p tylenol.   Has been nauseous and vomiting multiple times, last today; unable to tolerate po at home.   Noticed L buttock and L foot rash 4 days ago, pain started after. Denies sob, cough, loss of sensation, other recent infection, travel. Never had chicken pox before, unsure if vaccinated    pt w/ zoster   completed course of acyclovir  cleared by ID for dc to home 59 yo Male no  pmh p/w headaches, N/V, and rash x 4 days.   Reports HA was gradual onset and has been over bilateral scalp, mildly improved today s/p tylenol.   Has been nauseous and vomiting multiple times, last today; unable to tolerate po at home.   Noticed L buttock and L foot rash 4 days ago, pain started after. Denies sob, cough, loss of sensation, other recent infection, travel. Never had chicken pox before, unsure if vaccinated    pt w/ zoster   Lesions crusted  completed course of acyclovir  cleared by ID for dc to home

## 2018-07-01 NOTE — PROGRESS NOTE ADULT - ASSESSMENT
pt w/ zoster   r/o disseminated disease  id  follow up  cont acyclovir as per id  dvt proph  htn  pt not on meds  monitor  d/c planning if ok for PO Acyclovir from ID

## 2018-07-01 NOTE — DISCHARGE NOTE ADULT - FUNCTIONAL SCREEN CURRENT LEVEL: AMBULATION, MLM
Hepatitis panel done on 12/15/17 please see results.  I spoke with patient and F/u visit along with fibroscan scheduled 1/17/18; appt notice mailed.  Patient asked that provider release his vl count in [facility].   (0) independent

## 2018-07-01 NOTE — DISCHARGE NOTE ADULT - PROVIDER TOKENS
FREE:[LAST:[Dr. Austin],FIRST:[Matt],PHONE:[(   )    -],FAX:[(   )    -],ADDRESS:[Primary care doctor]]

## 2018-07-02 LAB
ALBUMIN SERPL ELPH-MCNC: 3.3 G/DL — SIGNIFICANT CHANGE UP (ref 3.3–5)
ALP SERPL-CCNC: 49 U/L — SIGNIFICANT CHANGE UP (ref 40–120)
ALT FLD-CCNC: 36 U/L — SIGNIFICANT CHANGE UP (ref 10–45)
ANION GAP SERPL CALC-SCNC: 10 MMOL/L — SIGNIFICANT CHANGE UP (ref 5–17)
AST SERPL-CCNC: 21 U/L — SIGNIFICANT CHANGE UP (ref 10–40)
BILIRUB SERPL-MCNC: 0.5 MG/DL — SIGNIFICANT CHANGE UP (ref 0.2–1.2)
BUN SERPL-MCNC: 11 MG/DL — SIGNIFICANT CHANGE UP (ref 7–23)
CALCIUM SERPL-MCNC: 8.5 MG/DL — SIGNIFICANT CHANGE UP (ref 8.4–10.5)
CHLORIDE SERPL-SCNC: 100 MMOL/L — SIGNIFICANT CHANGE UP (ref 96–108)
CO2 SERPL-SCNC: 28 MMOL/L — SIGNIFICANT CHANGE UP (ref 22–31)
CREAT SERPL-MCNC: 1.07 MG/DL — SIGNIFICANT CHANGE UP (ref 0.5–1.3)
GLUCOSE SERPL-MCNC: 122 MG/DL — HIGH (ref 70–99)
HCT VFR BLD CALC: 38.5 % — LOW (ref 39–50)
HGB BLD-MCNC: 13 G/DL — SIGNIFICANT CHANGE UP (ref 13–17)
MCHC RBC-ENTMCNC: 30.3 PG — SIGNIFICANT CHANGE UP (ref 27–34)
MCHC RBC-ENTMCNC: 33.8 GM/DL — SIGNIFICANT CHANGE UP (ref 32–36)
MCV RBC AUTO: 89.7 FL — SIGNIFICANT CHANGE UP (ref 80–100)
PLATELET # BLD AUTO: 237 K/UL — SIGNIFICANT CHANGE UP (ref 150–400)
POTASSIUM SERPL-MCNC: 4.1 MMOL/L — SIGNIFICANT CHANGE UP (ref 3.5–5.3)
POTASSIUM SERPL-SCNC: 4.1 MMOL/L — SIGNIFICANT CHANGE UP (ref 3.5–5.3)
PROT SERPL-MCNC: 6.3 G/DL — SIGNIFICANT CHANGE UP (ref 6–8.3)
RBC # BLD: 4.29 M/UL — SIGNIFICANT CHANGE UP (ref 4.2–5.8)
RBC # FLD: 13.1 % — SIGNIFICANT CHANGE UP (ref 10.3–14.5)
SODIUM SERPL-SCNC: 138 MMOL/L — SIGNIFICANT CHANGE UP (ref 135–145)
WBC # BLD: 6.08 K/UL — SIGNIFICANT CHANGE UP (ref 3.8–10.5)
WBC # FLD AUTO: 6.08 K/UL — SIGNIFICANT CHANGE UP (ref 3.8–10.5)

## 2018-07-02 PROCEDURE — 99232 SBSQ HOSP IP/OBS MODERATE 35: CPT

## 2018-07-02 RX ADMIN — SODIUM CHLORIDE 60 MILLILITER(S): 9 INJECTION INTRAMUSCULAR; INTRAVENOUS; SUBCUTANEOUS at 06:14

## 2018-07-02 RX ADMIN — Medication 1 TABLET(S): at 12:51

## 2018-07-02 RX ADMIN — Medication 165 MILLIGRAM(S): at 06:10

## 2018-07-02 RX ADMIN — Medication 165 MILLIGRAM(S): at 12:51

## 2018-07-02 RX ADMIN — LOSARTAN POTASSIUM 25 MILLIGRAM(S): 100 TABLET, FILM COATED ORAL at 06:10

## 2018-07-02 RX ADMIN — Medication 650 MILLIGRAM(S): at 17:50

## 2018-07-02 RX ADMIN — HEPARIN SODIUM 5000 UNIT(S): 5000 INJECTION INTRAVENOUS; SUBCUTANEOUS at 06:10

## 2018-07-02 RX ADMIN — Medication 165 MILLIGRAM(S): at 22:12

## 2018-07-02 RX ADMIN — HEPARIN SODIUM 5000 UNIT(S): 5000 INJECTION INTRAVENOUS; SUBCUTANEOUS at 17:50

## 2018-07-02 NOTE — PROGRESS NOTE ADULT - SUBJECTIVE AND OBJECTIVE BOX
Chief complaint: Herpes Zoster      PAST MEDICAL & SURGICAL HISTORY:  HTN (hypertension)          REVIEW OF SYSTEMS:  CONSTITUTIONAL: No fever, weight loss, or fatigue  EYES: No eye pain, visual disturbances, or discharge  NECK: No pain or stiffness  RESPIRATORY: No cough, wheezing, chills or hemoptysis; No Shortness of Breath  CARDIOVASCULAR: No chest pain, palpitations, passing out, dizziness, or leg swelling  GASTROINTESTINAL: No abdominal or epigastric pain. No nausea, vomiting, or hematemesis; No diarrhea or constipation. No melena or hematochezia.  GENITOURINARY: No dysuria, frequency, hematuria, or incontinence  NEUROLOGICAL: No headaches, memory loss, loss of strength, numbness, or tremors  rash buttock  MUSCULOSKELETAL: No joint pain or swelling; No muscle, back, or extremity pain      Medications:  MEDICATIONS  (STANDING):  acyclovir IVPB 750 milliGRAM(s) IV Intermittent every 8 hours  heparin  Injectable 5000 Unit(s) SubCutaneous every 12 hours  losartan 25 milliGRAM(s) Oral daily  multivitamin 1 Tablet(s) Oral daily  sodium chloride 0.9%. 1000 milliLiter(s) (60 mL/Hr) IV Continuous <Continuous>    MEDICATIONS  (PRN):  acetaminophen   Tablet 650 milliGRAM(s) Oral every 6 hours PRN For Temp greater than 38.5 C (101.3 F)  acetaminophen   Tablet. 650 milliGRAM(s) Oral every 6 hours PRN Mild Pain (1 - 3)    	    PHYSICAL EXAM:  T(C): 36.4 (07-02-18 @ 09:14), Max: 37.6 (07-01-18 @ 16:27)  HR: 83 (07-02-18 @ 09:14) (70 - 88)  BP: 110/73 (07-02-18 @ 09:14) (106/75 - 119/80)  RR: 16 (07-02-18 @ 09:14) (16 - 18)  SpO2: 96% (07-02-18 @ 06:08) (95% - 98%)  Wt(kg): --  I&O's Summary    01 Jul 2018 07:01  -  02 Jul 2018 07:00  --------------------------------------------------------  IN: 2780 mL / OUT: 0 mL / NET: 2780 mL    02 Jul 2018 07:01  -  02 Jul 2018 10:25  --------------------------------------------------------  IN: 240 mL / OUT: 0 mL / NET: 240 mL      Appearance: Normal	  HEENT:   Normal oral mucosa, PERRL, EOMI	  Lymphatic: No lymphadenopathy  Cardiovascular: Normal S1 S2, No JVD, No murmurs, No edema  Respiratory: Lungs clear to auscultation	  Psychiatry: A & O x 3, Mood & affect appropriate  Gastrointestinal:  Soft, Non-tender, + BS	  Skin:rash/lesions vesicular on l buttock /foot  Neurologic: Non-focal  Extremities: Normal range of motion, No clubbing, cyanosis or edema  Vascular: Peripheral pulses palpable 2+ bilaterally    LABS:	 	    CARDIAC MARKERS:                                13.0   6.08  )-----------( 237      ( 02 Jul 2018 07:58 )             38.5     07-02    138  |  100  |  11  ----------------------------<  122<H>  4.1   |  28  |  1.07    Ca    8.5      02 Jul 2018 06:56    TPro  6.3  /  Alb  3.3  /  TBili  0.5  /  DBili  x   /  AST  21  /  ALT  36  /  AlkPhos  49  07-02    proBNP:   Lipid Profile:   HgA1c:   TSH:

## 2018-07-02 NOTE — PROGRESS NOTE ADULT - ASSESSMENT
57 yo Male PMH HTN p/w headaches, N/V, and rash over L4-S1 dermatomes, now with fever to 101 F  No meningeal signs, or photophobia, fever and headaches - lesion are likely secondary to disseminated zoster (PCR VZV positive).     Leukocytosis resolved  Afebrile  Blood cxs NGTD  HIV negative  Lesions almost all crusted    Recommend:  -Continue acyclovir 10mg/kg IV q8h with IVF while on acyclovir  -Continue airborne and contact isolation until all lesions crusted.  -Once lesions crusted can change to oral valtrex to complete a 7-day course.

## 2018-07-02 NOTE — PROGRESS NOTE ADULT - SUBJECTIVE AND OBJECTIVE BOX
CC: Patient is a 58y old  Male who presents with a chief complaint of     Interval History/ROS: Patient states headache improving. Lesions almost all crusted. Remains with a vesicular lesions on left thigh and foot.    Allergies  No Known Allergies    ANTIMICROBIALS:  acyclovir IVPB 750 every 8 hours    PE:    Vital Signs Last 24 Hrs  T(C): 36.4 (02 Jul 2018 09:14), Max: 37.6 (01 Jul 2018 16:27)  T(F): 97.5 (02 Jul 2018 09:14), Max: 99.6 (01 Jul 2018 16:27)  HR: 83 (02 Jul 2018 09:14) (70 - 88)  BP: 110/73 (02 Jul 2018 09:14) (106/75 - 119/80)  BP(mean): --  RR: 16 (02 Jul 2018 09:14) (16 - 18)  SpO2: 96% (02 Jul 2018 06:08) (95% - 98%)    Gen: AOx3, NAD, non-toxic, pleasant  CV: S1+S2 normal, no murmurs, nontachycardic  Resp: Clear bilat, no resp distress, no crackles/wheezes  Abd: Soft, nontender, +BS  Ext: No LE edema, no wounds  : No [place]  IV/Skin: No thrombophlebitis, crusted lesions on left buttock and left foot with one vesicular lesion on foot and buttock.  Neuro: no focal deficits    LABS:                          13.0   6.08  )-----------( 237      ( 02 Jul 2018 07:58 )             38.5       07-02    138  |  100  |  11  ----------------------------<  122<H>  4.1   |  28  |  1.07    Ca    8.5      02 Jul 2018 06:56    TPro  6.3  /  Alb  3.3  /  TBili  0.5  /  DBili  x   /  AST  21  /  ALT  36  /  AlkPhos  49  07-02      MICROBIOLOGY:  v  .Blood Blood-Peripheral  06-29-18   No growth to date.  --  --    RADIOLOGY:    < from: CT Head No Cont (06.28.18 @ 15:44) >  IMPRESSION: Unremarkable noncontrast CT of the brain.      < end of copied text >

## 2018-07-02 NOTE — PROGRESS NOTE ADULT - ASSESSMENT
pt w/ zoster   r/o disseminated disease  cont acyclovir as per id  dvt proph  htn  pt not on meds  monitor  ID f/u  d/c planning when ok to transition to PO Acyclovir per ID

## 2018-07-03 PROCEDURE — 99232 SBSQ HOSP IP/OBS MODERATE 35: CPT

## 2018-07-03 RX ADMIN — HEPARIN SODIUM 5000 UNIT(S): 5000 INJECTION INTRAVENOUS; SUBCUTANEOUS at 05:10

## 2018-07-03 RX ADMIN — SODIUM CHLORIDE 60 MILLILITER(S): 9 INJECTION INTRAMUSCULAR; INTRAVENOUS; SUBCUTANEOUS at 05:13

## 2018-07-03 RX ADMIN — LOSARTAN POTASSIUM 25 MILLIGRAM(S): 100 TABLET, FILM COATED ORAL at 05:10

## 2018-07-03 RX ADMIN — Medication 165 MILLIGRAM(S): at 21:36

## 2018-07-03 RX ADMIN — Medication 165 MILLIGRAM(S): at 13:40

## 2018-07-03 RX ADMIN — HEPARIN SODIUM 5000 UNIT(S): 5000 INJECTION INTRAVENOUS; SUBCUTANEOUS at 18:48

## 2018-07-03 RX ADMIN — Medication 165 MILLIGRAM(S): at 05:10

## 2018-07-03 RX ADMIN — Medication 1 TABLET(S): at 13:41

## 2018-07-03 RX ADMIN — SODIUM CHLORIDE 60 MILLILITER(S): 9 INJECTION INTRAMUSCULAR; INTRAVENOUS; SUBCUTANEOUS at 21:36

## 2018-07-03 NOTE — PROGRESS NOTE ADULT - ASSESSMENT
57 yo Male PMH HTN p/w headaches, N/V, and rash over L4-S1 dermatomes, now with fever to 101 F  No meningeal signs, or photophobia, fever and headaches - lesion are likely secondary to disseminated zoster (PCR VZV positive).     Leukocytosis resolved  Afebrile  Blood cxs NGTD  HIV negative  Headaches resolved  Lesions almost all crusted - remains with vesicular lesions on left foot and left thigh    Recommend:  -Continue acyclovir 10mg/kg IV q8h with IVF while on acyclovir  -Continue airborne and contact isolation until all lesions crusted.  -Once lesions crusted can be discharged on oral valtrex to complete a 7-day course.

## 2018-07-03 NOTE — PROGRESS NOTE ADULT - SUBJECTIVE AND OBJECTIVE BOX
CC: Patient is a 58y old  Male who presents with a chief complaint of headache and lesions on left foot and buttock    Interval History/ROS: Patient has no complaints. Headaches improved. Remains with vesicular lesions on the left buttock and left foot. No fever, no chills.    Allergies  No Known Allergies    ANTIMICROBIALS:  acyclovir IVPB 750 every 8 hours    PE:    Vital Signs Last 24 Hrs  T(C): 36.4 (03 Jul 2018 05:09), Max: 36.9 (02 Jul 2018 20:46)  T(F): 97.6 (03 Jul 2018 05:09), Max: 98.4 (02 Jul 2018 20:46)  HR: 88 (03 Jul 2018 05:09) (80 - 88)  BP: 111/72 (03 Jul 2018 05:09) (108/74 - 115/74)  BP(mean): --  RR: 18 (03 Jul 2018 05:09) (16 - 18)  SpO2: 95% (03 Jul 2018 05:09) (95% - 96%)    Gen: AOx3, NAD, non-toxic, pleasant  CV: S1+S2 normal, no murmurs  Resp: Clear bilat, no resp distress  Abd: Soft, nontender, +BS  Ext: most lesions have crusted, remains with a few vesicular lesions on left buttock and foot  : No [place]  IV/Skin: No thrombophlebitis  Neuro: no focal deficits    LABS:                          13.0   6.08  )-----------( 237      ( 02 Jul 2018 07:58 )             38.5       07-02    138  |  100  |  11  ----------------------------<  122<H>  4.1   |  28  |  1.07    Ca    8.5      02 Jul 2018 06:56    TPro  6.3  /  Alb  3.3  /  TBili  0.5  /  DBili  x   /  AST  21  /  ALT  36  /  AlkPhos  49  07-02    MICROBIOLOGY:  v  .Blood Blood-Peripheral  06-29-18   No growth to date.  --  --    RADIOLOGY:    No new images.

## 2018-07-03 NOTE — PROGRESS NOTE ADULT - SUBJECTIVE AND OBJECTIVE BOX
Chief complaint: Herpes Zoster      PAST MEDICAL & SURGICAL HISTORY:  HTN (hypertension)          REVIEW OF SYSTEMS:  CONSTITUTIONAL: No fever, weight loss, or fatigue  EYES: No eye pain, visual disturbances, or discharge  NECK: No pain or stiffness  RESPIRATORY: No cough, wheezing, chills or hemoptysis; No Shortness of Breath  CARDIOVASCULAR: No chest pain, palpitations, passing out, dizziness, or leg swelling  GASTROINTESTINAL: No abdominal or epigastric pain. No nausea, vomiting, or hematemesis; No diarrhea or constipation. No melena or hematochezia.  GENITOURINARY: No dysuria, frequency, hematuria, or incontinence  NEUROLOGICAL: No headaches, memory loss, loss of strength, numbness, or tremors  rash buttock  MUSCULOSKELETAL: No joint pain or swelling; No muscle, back, or extremity pain      Medications:  MEDICATIONS  (STANDING):  acyclovir IVPB 750 milliGRAM(s) IV Intermittent every 8 hours  heparin  Injectable 5000 Unit(s) SubCutaneous every 12 hours  losartan 25 milliGRAM(s) Oral daily  multivitamin 1 Tablet(s) Oral daily  sodium chloride 0.9%. 1000 milliLiter(s) (60 mL/Hr) IV Continuous <Continuous>    MEDICATIONS  (PRN):  acetaminophen   Tablet 650 milliGRAM(s) Oral every 6 hours PRN For Temp greater than 38.5 C (101.3 F)  acetaminophen   Tablet. 650 milliGRAM(s) Oral every 6 hours PRN Mild Pain (1 - 3)    	    PHYSICAL EXAM:  T(C): 36.7 (07-03-18 @ 12:47), Max: 36.9 (07-02-18 @ 20:46)  HR: 70 (07-03-18 @ 12:47) (70 - 88)  BP: 114/78 (07-03-18 @ 12:47) (108/74 - 114/78)  RR: 18 (07-03-18 @ 12:47) (18 - 18)  SpO2: 95% (07-03-18 @ 12:47) (95% - 96%)  Wt(kg): --  I&O's Summary    02 Jul 2018 07:01  -  03 Jul 2018 07:00  --------------------------------------------------------  IN: 2755 mL / OUT: 0 mL / NET: 2755 mL    03 Jul 2018 07:01  -  03 Jul 2018 14:02  --------------------------------------------------------  IN: 360 mL / OUT: 0 mL / NET: 360 mL      Appearance: Normal	  HEENT:   Normal oral mucosa, PERRL, EOMI	  Lymphatic: No lymphadenopathy  Cardiovascular: Normal S1 S2, No JVD, No murmurs, No edema  Respiratory: Lungs clear to auscultation	  Psychiatry: A & O x 3, Mood & affect appropriate  Gastrointestinal:  Soft, Non-tender, + BS	  Skin:rash/lesions vesicular on l buttock /foot  Neurologic: Non-focal  Extremities: Normal range of motion, No clubbing, cyanosis or edema  Vascular: Peripheral pulses palpable 2+ bilaterally    LABS:	 	    CARDIAC MARKERS:                                13.0   6.08  )-----------( 237      ( 02 Jul 2018 07:58 )             38.5     07-02    138  |  100  |  11  ----------------------------<  122<H>  4.1   |  28  |  1.07    Ca    8.5      02 Jul 2018 06:56    TPro  6.3  /  Alb  3.3  /  TBili  0.5  /  DBili  x   /  AST  21  /  ALT  36  /  AlkPhos  49  07-02    proBNP:   Lipid Profile:   HgA1c:   TSH:

## 2018-07-03 NOTE — PROGRESS NOTE ADULT - ASSESSMENT
pt w/ zoster   r/o disseminated disease  cont acyclovir as per id  dvt proph  htn  pt not on meds  monitor  ID f/u noted  awaiting for lesions to crust before changing to PO

## 2018-07-04 VITALS — TEMPERATURE: 98 F | HEART RATE: 88 BPM | RESPIRATION RATE: 16 BRPM

## 2018-07-04 LAB
ANION GAP SERPL CALC-SCNC: 10 MMOL/L — SIGNIFICANT CHANGE UP (ref 5–17)
BUN SERPL-MCNC: 14 MG/DL — SIGNIFICANT CHANGE UP (ref 7–23)
CALCIUM SERPL-MCNC: 8.9 MG/DL — SIGNIFICANT CHANGE UP (ref 8.4–10.5)
CHLORIDE SERPL-SCNC: 99 MMOL/L — SIGNIFICANT CHANGE UP (ref 96–108)
CO2 SERPL-SCNC: 27 MMOL/L — SIGNIFICANT CHANGE UP (ref 22–31)
CREAT SERPL-MCNC: 1.1 MG/DL — SIGNIFICANT CHANGE UP (ref 0.5–1.3)
CULTURE RESULTS: SIGNIFICANT CHANGE UP
CULTURE RESULTS: SIGNIFICANT CHANGE UP
GLUCOSE SERPL-MCNC: 116 MG/DL — HIGH (ref 70–99)
POTASSIUM SERPL-MCNC: 4.9 MMOL/L — SIGNIFICANT CHANGE UP (ref 3.5–5.3)
POTASSIUM SERPL-SCNC: 4.9 MMOL/L — SIGNIFICANT CHANGE UP (ref 3.5–5.3)
SODIUM SERPL-SCNC: 136 MMOL/L — SIGNIFICANT CHANGE UP (ref 135–145)
SPECIMEN SOURCE: SIGNIFICANT CHANGE UP
SPECIMEN SOURCE: SIGNIFICANT CHANGE UP

## 2018-07-04 PROCEDURE — 99285 EMERGENCY DEPT VISIT HI MDM: CPT | Mod: 25

## 2018-07-04 PROCEDURE — 87529 HSV DNA AMP PROBE: CPT

## 2018-07-04 PROCEDURE — 87389 HIV-1 AG W/HIV-1&-2 AB AG IA: CPT

## 2018-07-04 PROCEDURE — 99232 SBSQ HOSP IP/OBS MODERATE 35: CPT

## 2018-07-04 PROCEDURE — 96375 TX/PRO/DX INJ NEW DRUG ADDON: CPT

## 2018-07-04 PROCEDURE — 80053 COMPREHEN METABOLIC PANEL: CPT

## 2018-07-04 PROCEDURE — 96374 THER/PROPH/DIAG INJ IV PUSH: CPT

## 2018-07-04 PROCEDURE — 87040 BLOOD CULTURE FOR BACTERIA: CPT

## 2018-07-04 PROCEDURE — 87798 DETECT AGENT NOS DNA AMP: CPT

## 2018-07-04 PROCEDURE — 80048 BASIC METABOLIC PNL TOTAL CA: CPT

## 2018-07-04 PROCEDURE — 85027 COMPLETE CBC AUTOMATED: CPT

## 2018-07-04 PROCEDURE — 70450 CT HEAD/BRAIN W/O DYE: CPT

## 2018-07-04 RX ADMIN — HEPARIN SODIUM 5000 UNIT(S): 5000 INJECTION INTRAVENOUS; SUBCUTANEOUS at 06:14

## 2018-07-04 RX ADMIN — LOSARTAN POTASSIUM 25 MILLIGRAM(S): 100 TABLET, FILM COATED ORAL at 06:14

## 2018-07-04 RX ADMIN — Medication 1 TABLET(S): at 13:12

## 2018-07-04 RX ADMIN — Medication 165 MILLIGRAM(S): at 06:14

## 2018-07-04 RX ADMIN — Medication 165 MILLIGRAM(S): at 13:12

## 2018-07-04 NOTE — PROGRESS NOTE ADULT - ASSESSMENT
pt w/ zoster   r/o disseminated disease  cont acyclovir as per id  dvt proph  htn  pt not on meds  monitor  ID f/u noted  d/c planing

## 2018-07-04 NOTE — PROGRESS NOTE ADULT - ASSESSMENT
57 yo Male PMH HTN p/w headaches, N/V, and rash over L4-S1 dermatomes, now with fever to 101 F  No meningeal signs, or photophobia, fever and headaches - lesion are likely secondary to disseminated zoster (PCR VZV positive).       Afebrile  Blood cxs NGTD  HIV negative  Headaches resolved  Lesions almost all crusted     Recommend:  Day 7/7 of acyclovir today.   Cleared to be discharged from ID perspective.

## 2018-07-04 NOTE — PROGRESS NOTE ADULT - SUBJECTIVE AND OBJECTIVE BOX
58y old  Male who presents with a chief complaint of Rash     Interval history:  Afebrile, wants to go home. Rash mostly crusted.     No Known Allergies    Antimicrobials:    acyclovir IVPB 750 milliGRAM(s) IV Intermittent every 8 hours    REVIEW OF SYSTEMS:  No chest pain  No cough,   No N/V/D,   No dysuria       Vital Signs Last 24 Hrs  T(C): 36.3 (07-04-18 @ 08:13), Max: 37 (07-03-18 @ 20:46)  T(F): 97.4 (07-04-18 @ 08:13), Max: 98.6 (07-03-18 @ 20:46)  HR: 80 (07-04-18 @ 08:13) (70 - 80)  BP: 95/65 (07-04-18 @ 08:13) (95/65 - 114/78)  RR: 16 (07-04-18 @ 08:13) (16 - 18)  SpO2: 96% (07-04-18 @ 06:13) (95% - 96%)    PHYSICAL EXAM:  Gen: AOx3, NAD,   CV: S1+S2 normal,   Resp: Clear bilat,   Abd: Soft, nontender, +BS  Ext: Almost all lesions crusted already.   IV/Skin: No thrombophlebitis        07-04    136  |  99  |  14  ----------------------------<  116<H>  4.9   |  27  |  1.10    Ca    8.9      04 Jul 2018 07:02

## 2018-07-04 NOTE — PROGRESS NOTE ADULT - SUBJECTIVE AND OBJECTIVE BOX
Chief complaint: Herpes Zoster      PAST MEDICAL & SURGICAL HISTORY:  HTN (hypertension)          REVIEW OF SYSTEMS:  CONSTITUTIONAL: No fever, weight loss, or fatigue  EYES: No eye pain, visual disturbances, or discharge  NECK: No pain or stiffness  RESPIRATORY: No cough, wheezing, chills or hemoptysis; No Shortness of Breath  CARDIOVASCULAR: No chest pain, palpitations, passing out, dizziness, or leg swelling  GASTROINTESTINAL: No abdominal or epigastric pain. No nausea, vomiting, or hematemesis; No diarrhea or constipation. No melena or hematochezia.  GENITOURINARY: No dysuria, frequency, hematuria, or incontinence  NEUROLOGICAL: No headaches, memory loss, loss of strength, numbness, or tremors  rash buttock  MUSCULOSKELETAL: No joint pain or swelling; No muscle, back, or extremity pain      Medications:  MEDICATIONS  (STANDING):  acyclovir IVPB 750 milliGRAM(s) IV Intermittent every 8 hours  heparin  Injectable 5000 Unit(s) SubCutaneous every 12 hours  losartan 25 milliGRAM(s) Oral daily  multivitamin 1 Tablet(s) Oral daily  sodium chloride 0.9%. 1000 milliLiter(s) (60 mL/Hr) IV Continuous <Continuous>    MEDICATIONS  (PRN):  acetaminophen   Tablet 650 milliGRAM(s) Oral every 6 hours PRN For Temp greater than 38.5 C (101.3 F)  acetaminophen   Tablet. 650 milliGRAM(s) Oral every 6 hours PRN Mild Pain (1 - 3)    	    PHYSICAL EXAM:  T(C): 36.7 (07-03-18 @ 12:47), Max: 36.9 (07-02-18 @ 20:46)  HR: 70 (07-03-18 @ 12:47) (70 - 88)  BP: 114/78 (07-03-18 @ 12:47) (108/74 - 114/78)  RR: 18 (07-03-18 @ 12:47) (18 - 18)  SpO2: 95% (07-03-18 @ 12:47) (95% - 96%)  Wt(kg): --  I&O's Summary    02 Jul 2018 07:01  -  03 Jul 2018 07:00  --------------------------------------------------------  IN: 2755 mL / OUT: 0 mL / NET: 2755 mL    03 Jul 2018 07:01  -  03 Jul 2018 14:02  --------------------------------------------------------  IN: 360 mL / OUT: 0 mL / NET: 360 mL      Appearance: Normal	  HEENT:   Normal oral mucosa, PERRL, EOMI	  Lymphatic: No lymphadenopathy  Cardiovascular: Normal S1 S2, No JVD, No murmurs, No edema  Respiratory: Lungs clear to auscultation	  Psychiatry: A & O x 3, Mood & affect appropriate  Gastrointestinal:  Soft, Non-tender, + BS	  Skin:rash/lesions vesicular on l buttock /foot, lesions crusted  Neurologic: Non-focal  Extremities: Normal range of motion, No clubbing, cyanosis or edema  Vascular: Peripheral pulses palpable 2+ bilaterally    LABS:	 	    CARDIAC MARKERS:                                13.0   6.08  )-----------( 237      ( 02 Jul 2018 07:58 )             38.5     07-02    138  |  100  |  11  ----------------------------<  122<H>  4.1   |  28  |  1.07    Ca    8.5      02 Jul 2018 06:56    TPro  6.3  /  Alb  3.3  /  TBili  0.5  /  DBili  x   /  AST  21  /  ALT  36  /  AlkPhos  49  07-02    proBNP:   Lipid Profile:   HgA1c:   TSH:

## 2019-12-31 NOTE — PROGRESS NOTE ADULT - ATTENDING COMMENTS
Amos Amaya MD  Pager [phone]  After 5pm/weekends call [phone]
Amos Amaya MD  Pager [phone]  After 5pm/weekends call [phone]
Herman Lehman  Pager: [phone]. If no response or past 5 pm call [phone].
Ambulatory

## 2021-06-25 PROBLEM — I10 ESSENTIAL (PRIMARY) HYPERTENSION: Chronic | Status: ACTIVE | Noted: 2018-06-28

## 2021-07-20 PROBLEM — Z00.00 ENCOUNTER FOR PREVENTIVE HEALTH EXAMINATION: Status: ACTIVE | Noted: 2021-07-20

## 2021-08-11 ENCOUNTER — APPOINTMENT (OUTPATIENT)
Dept: UROLOGY | Facility: CLINIC | Age: 61
End: 2021-08-11
Payer: MEDICAID

## 2021-08-11 VITALS
WEIGHT: 155 LBS | HEART RATE: 68 BPM | HEIGHT: 64 IN | DIASTOLIC BLOOD PRESSURE: 89 MMHG | SYSTOLIC BLOOD PRESSURE: 140 MMHG | OXYGEN SATURATION: 98 % | RESPIRATION RATE: 16 BRPM | BODY MASS INDEX: 26.46 KG/M2

## 2021-08-11 DIAGNOSIS — Z78.9 OTHER SPECIFIED HEALTH STATUS: ICD-10-CM

## 2021-08-11 DIAGNOSIS — R97.20 ELEVATED PROSTATE, SPECIFIC ANTIGEN [PSA]: ICD-10-CM

## 2021-08-11 PROCEDURE — 99204 OFFICE O/P NEW MOD 45 MIN: CPT

## 2021-08-20 PROBLEM — R97.20 ELEVATED PSA: Status: ACTIVE | Noted: 2021-08-11

## 2021-08-20 PROBLEM — Z78.9 NO PERTINENT PAST MEDICAL HISTORY: Status: RESOLVED | Noted: 2021-08-20 | Resolved: 2021-08-20

## 2021-08-20 NOTE — HISTORY OF PRESENT ILLNESS
[FreeTextEntry1] : 60 yo Thai speaking M\par PSA 5.9 about 1 month ago, previously 6.4\par no dysuria, no gross hematuria\par no UTI\par no straining, some weak flow\par no MRI\par no prostate biopsy

## 2021-08-20 NOTE — ASSESSMENT
[FreeTextEntry1] : 62 yo M with elevated PSA\par \par - PVR = 54ml\par - Obtain records from PCP\par - PSA\par - Discussed possible etiologies for elevated PSA including benign vs. malignancy\par - Discussed pros and cons of proceeding with a prostate needle biopsy. Discussed risk and benefits including infection, hematochezia, hematuria, hematospermia as well as specificity and sensitivity of the biopsy. \par - Discussed the role of prostate MRI in the workup of elevated PSA as well\par - Proceed with prostate MRI

## 2021-08-24 LAB
APPEARANCE: CLEAR
BACTERIA UR CULT: NORMAL
BACTERIA: NEGATIVE
BILIRUBIN URINE: NEGATIVE
BLOOD URINE: NEGATIVE
COLOR: NORMAL
GLUCOSE QUALITATIVE U: NEGATIVE
HYALINE CASTS: 0 /LPF
KETONES URINE: NEGATIVE
LEUKOCYTE ESTERASE URINE: NEGATIVE
MICROSCOPIC-UA: NORMAL
NITRITE URINE: NEGATIVE
PH URINE: 6.5
PROTEIN URINE: NEGATIVE
PSA SERPL-MCNC: 4 NG/ML
RED BLOOD CELLS URINE: 0 /HPF
SPECIFIC GRAVITY URINE: 1.01
SQUAMOUS EPITHELIAL CELLS: 0 /HPF
UROBILINOGEN URINE: NORMAL
WHITE BLOOD CELLS URINE: 0 /HPF

## 2021-09-15 ENCOUNTER — RESULT REVIEW (OUTPATIENT)
Age: 61
End: 2021-09-15

## 2021-09-15 ENCOUNTER — APPOINTMENT (OUTPATIENT)
Dept: MRI IMAGING | Facility: IMAGING CENTER | Age: 61
End: 2021-09-15
Payer: MEDICAID

## 2021-09-15 ENCOUNTER — OUTPATIENT (OUTPATIENT)
Dept: OUTPATIENT SERVICES | Facility: HOSPITAL | Age: 61
LOS: 1 days | End: 2021-09-15
Payer: MEDICAID

## 2021-09-15 DIAGNOSIS — Z00.8 ENCOUNTER FOR OTHER GENERAL EXAMINATION: ICD-10-CM

## 2021-09-15 DIAGNOSIS — R97.20 ELEVATED PROSTATE SPECIFIC ANTIGEN [PSA]: ICD-10-CM

## 2021-09-15 PROCEDURE — 76498P: CUSTOM | Mod: 26

## 2021-09-15 PROCEDURE — 72197 MRI PELVIS W/O & W/DYE: CPT

## 2021-09-15 PROCEDURE — 72197 MRI PELVIS W/O & W/DYE: CPT | Mod: 26

## 2021-09-15 PROCEDURE — A9585: CPT

## 2021-09-15 PROCEDURE — 76498 UNLISTED MR PROCEDURE: CPT

## 2022-01-26 ENCOUNTER — APPOINTMENT (OUTPATIENT)
Age: 62
End: 2022-01-26
